# Patient Record
Sex: FEMALE
[De-identification: names, ages, dates, MRNs, and addresses within clinical notes are randomized per-mention and may not be internally consistent; named-entity substitution may affect disease eponyms.]

---

## 2020-08-21 ENCOUNTER — HOSPITAL ENCOUNTER (OUTPATIENT)
Dept: HOSPITAL 90 - RAH | Age: 65
Discharge: HOME | End: 2020-08-21
Attending: INTERNAL MEDICINE
Payer: MEDICARE

## 2020-08-21 DIAGNOSIS — R14.0: ICD-10-CM

## 2020-08-21 DIAGNOSIS — R68.81: Primary | ICD-10-CM

## 2020-08-21 DIAGNOSIS — R11.2: ICD-10-CM

## 2020-08-21 DIAGNOSIS — R10.9: ICD-10-CM

## 2020-08-21 PROCEDURE — 78264 GASTRIC EMPTYING IMG STUDY: CPT

## 2020-09-21 ENCOUNTER — HOSPITAL ENCOUNTER (OUTPATIENT)
Dept: HOSPITAL 90 - RAH | Age: 65
Discharge: HOME | End: 2020-09-21
Attending: INTERNAL MEDICINE
Payer: COMMERCIAL

## 2020-09-21 DIAGNOSIS — Z13.6: Primary | ICD-10-CM

## 2020-09-21 PROCEDURE — 75571 CT HRT W/O DYE W/CA TEST: CPT

## 2020-10-08 ENCOUNTER — HOSPITAL ENCOUNTER (OUTPATIENT)
Dept: HOSPITAL 90 - SHCH | Age: 65
Discharge: HOME | End: 2020-10-08
Attending: INTERNAL MEDICINE
Payer: MEDICARE

## 2020-10-08 DIAGNOSIS — I25.10: Primary | ICD-10-CM

## 2020-10-08 PROCEDURE — 93017 CV STRESS TEST TRACING ONLY: CPT

## 2020-10-08 PROCEDURE — 96374 THER/PROPH/DIAG INJ IV PUSH: CPT

## 2020-10-08 PROCEDURE — 78452 HT MUSCLE IMAGE SPECT MULT: CPT

## 2025-01-07 ENCOUNTER — HOSPITAL ENCOUNTER (OUTPATIENT)
Dept: HOSPITAL 90 - SHCH | Age: 70
Discharge: HOME | End: 2025-01-07
Attending: INTERNAL MEDICINE
Payer: COMMERCIAL

## 2025-01-07 DIAGNOSIS — R01.1: Primary | ICD-10-CM

## 2025-01-07 PROCEDURE — 93306 TTE W/DOPPLER COMPLETE: CPT

## 2025-01-08 NOTE — [facility]
--------------- APPROVED REPORT --------------





EXAM: Two-dimensional and M-mode echocardiogram with Doppler and color Doppler.



INDICATION

ICD:  R01.1 Cardiac murmur, unspecified 



2D Dimensions

RVDd3.8 cmLVEF(%)48.8 (>50%)LVED Vol(simp.)93.0 mL

IVSd1.1 (0.7-1.1cm)FS(%)24 %LVES Vol(simp.)48.0 mL

LVDd4.1 (3.8-5.6cm)Ao Root(2D)3.3 (2.0-3.7cm)LVEF(%, simp.)48 %

PWd1.0 (0.7-1.1cm)LVOT diam2.1 (1.8-2.4cm)LA ESV INDEX (BP)24.42 mL/m2

LVDs3.1 (2.5-4.0cm)IVC diam1.7 cm



Aortic Valve

AoV Vmax1.4 m/Gris Peak GR7.9 mmHgLVOT Vmax0.9 m/s

AoV VTI0.3 mAo Mean GR4.4 mmHgLVOT VTI0.20 m

ATIF (VMAX)2.1 cm2AVA (VTI) 2.1 cm2



Mitral Valve

MV E Vmax72.0 cm/sDECEL Gbnw840 ms

MV A Vmax95.0 cm/sP 1/2 T72 ms

E/A ratio0.8MVA (PHT)3.1 cm2



TDI

E/E' Utjkfg57.4E/E' [id number]



Pulmonary Valve

PV Vmax1.0 m/sPV VTI0.20 mPV Mean GR2 mmHg

PV Peak GR3.8 mmHgPI End Yady. Varinder 1.0 cm/s



Tricuspid Valve

TR Vmax2.4 m/sRAP (EST) 3 [id number] mmHg

TR Peak GR22.5 mmHg



Left Ventricle

The left ventricle structure and function is normal. There is normal LV segmental wall motion. There 
is borderline left ventricular hypertrophy. LVEF is 50-55%. Grade 1 diastolic dysfunction



Right Ventricle

The right ventricle is normal size. The right ventricular systolic function is normal.



Atria

The left atrium size is normal. The right atrium size is normal.



Aortic Valve

Aortic valve is trileaflet. Aortic valve leaflets are sclerotic but open well. Trace aortic regurgita
tion. There is no aortic valvular stenosis. 



Mitral Valve

Mitral valve leaflets are mildly sclerotic but open well. Mitral regurgitation is trace. There is no 
mitral valve stenosis.



Tricuspid Valve

The tricuspid valve leaflets appear normal. There is trace tricuspid regurgitation.



Pulmonic Valve

The pulmonic valve leaflets are thin and pliable; valve motion is normal. There is trace pulmonic jade
vular regurgitation.



Great Vessels

The aortic root is normal in size. The IVC is normal in size and collapses >50% with inspiration.



Pericardium

No pericardial effusion.



Conclusion

LVEF is 50-55%.

Grade 1 diastolic dysfunction